# Patient Record
Sex: FEMALE | Race: BLACK OR AFRICAN AMERICAN | NOT HISPANIC OR LATINO | URBAN - METROPOLITAN AREA
[De-identification: names, ages, dates, MRNs, and addresses within clinical notes are randomized per-mention and may not be internally consistent; named-entity substitution may affect disease eponyms.]

---

## 2022-10-14 ENCOUNTER — CLAIMS CREATED FROM THE CLAIM WINDOW (OUTPATIENT)
Dept: URBAN - METROPOLITAN AREA MEDICAL CENTER 12 | Facility: MEDICAL CENTER | Age: 32
End: 2022-10-14

## 2022-10-14 ENCOUNTER — CLAIMS CREATED FROM THE CLAIM WINDOW (OUTPATIENT)
Dept: URBAN - METROPOLITAN AREA MEDICAL CENTER 12 | Facility: MEDICAL CENTER | Age: 32
End: 2022-10-14
Payer: MEDICARE

## 2022-10-14 DIAGNOSIS — K85.80 ACUTE VIRAL PANCREATITIS: ICD-10-CM

## 2022-10-14 DIAGNOSIS — E10.10 DKA, TYPE 1: ICD-10-CM

## 2022-10-14 DIAGNOSIS — R74.8 ABNORMAL ALKALINE PHOSPHATASE TEST: ICD-10-CM

## 2022-10-14 DIAGNOSIS — R10.84 ABDOMINAL CRAMPING, GENERALIZED: ICD-10-CM

## 2022-10-14 PROCEDURE — 99222 1ST HOSP IP/OBS MODERATE 55: CPT | Performed by: PHYSICIAN ASSISTANT

## 2022-10-14 PROCEDURE — G8427 DOCREV CUR MEDS BY ELIG CLIN: HCPCS | Performed by: PHYSICIAN ASSISTANT

## 2022-10-15 ENCOUNTER — OUT OF OFFICE VISIT (OUTPATIENT)
Dept: URBAN - METROPOLITAN AREA MEDICAL CENTER 12 | Facility: MEDICAL CENTER | Age: 32
End: 2022-10-15
Payer: MEDICARE

## 2022-10-15 DIAGNOSIS — R11.2 ACUTE NAUSEA WITH NONBILIOUS VOMITING: ICD-10-CM

## 2022-10-15 DIAGNOSIS — K85.80 ACUTE VIRAL PANCREATITIS: ICD-10-CM

## 2022-10-15 DIAGNOSIS — R74.8 ABNORMAL ALKALINE PHOSPHATASE TEST: ICD-10-CM

## 2022-10-15 DIAGNOSIS — R10.84 ABDOMINAL CRAMPING, GENERALIZED: ICD-10-CM

## 2022-10-15 PROCEDURE — 99232 SBSQ HOSP IP/OBS MODERATE 35: CPT | Performed by: STUDENT IN AN ORGANIZED HEALTH CARE EDUCATION/TRAINING PROGRAM

## 2022-10-24 ENCOUNTER — OUT OF OFFICE VISIT (OUTPATIENT)
Dept: URBAN - METROPOLITAN AREA MEDICAL CENTER 28 | Facility: MEDICAL CENTER | Age: 32
End: 2022-10-24
Payer: MEDICARE

## 2022-10-24 DIAGNOSIS — E10.10 DIABETIC KETOACIDOSIS WITHOUT COMA ASSOCIATED WITH TYPE 1 DIABETES MELLITUS: ICD-10-CM

## 2022-10-24 DIAGNOSIS — R10.84 ABDOMINAL CRAMPING, GENERALIZED: ICD-10-CM

## 2022-10-24 DIAGNOSIS — E10.65 DIABETES MELLITUS TYPE 1, UNCONTROLLED, INSULIN DEPENDENT: ICD-10-CM

## 2022-10-24 DIAGNOSIS — D64.89 ANEMIA DUE TO OTHER CAUSE: ICD-10-CM

## 2022-10-24 DIAGNOSIS — R11.2 ACUTE NAUSEA WITH NONBILIOUS VOMITING: ICD-10-CM

## 2022-10-24 PROCEDURE — 99232 SBSQ HOSP IP/OBS MODERATE 35: CPT

## 2022-10-24 PROCEDURE — 99222 1ST HOSP IP/OBS MODERATE 55: CPT

## 2022-10-24 PROCEDURE — G8427 DOCREV CUR MEDS BY ELIG CLIN: HCPCS

## 2022-10-28 ENCOUNTER — OUT OF OFFICE VISIT (OUTPATIENT)
Dept: URBAN - METROPOLITAN AREA MEDICAL CENTER 28 | Facility: MEDICAL CENTER | Age: 32
End: 2022-10-28
Payer: MEDICARE

## 2022-10-28 DIAGNOSIS — K29.60 ADENOPAPILLOMATOSIS GASTRICA: ICD-10-CM

## 2022-10-28 DIAGNOSIS — K22.10 BARRETT'S ESOPHAGEAL ULCERATION: ICD-10-CM

## 2022-10-28 DIAGNOSIS — D50.9 ANEMIA: ICD-10-CM

## 2022-10-28 PROCEDURE — 45378 DIAGNOSTIC COLONOSCOPY: CPT | Performed by: INTERNAL MEDICINE

## 2022-10-28 PROCEDURE — 43239 EGD BIOPSY SINGLE/MULTIPLE: CPT | Performed by: INTERNAL MEDICINE

## 2022-11-23 ENCOUNTER — OUT OF OFFICE VISIT (OUTPATIENT)
Dept: URBAN - METROPOLITAN AREA MEDICAL CENTER 28 | Facility: MEDICAL CENTER | Age: 32
End: 2022-11-23
Payer: MEDICARE

## 2022-11-23 DIAGNOSIS — R11.2 ACUTE NAUSEA WITH NONBILIOUS VOMITING: ICD-10-CM

## 2022-11-23 DIAGNOSIS — E10.43 GASTROPARESIS DUE TO TYPE 1 DIABETES MELLITUS: ICD-10-CM

## 2022-11-23 PROCEDURE — 99223 1ST HOSP IP/OBS HIGH 75: CPT | Performed by: PHYSICIAN ASSISTANT

## 2022-11-23 PROCEDURE — G8427 DOCREV CUR MEDS BY ELIG CLIN: HCPCS | Performed by: PHYSICIAN ASSISTANT

## 2022-12-07 PROBLEM — 420270002: Status: ACTIVE | Noted: 2022-12-07

## 2023-02-13 ENCOUNTER — CLAIMS CREATED FROM THE CLAIM WINDOW (OUTPATIENT)
Dept: URBAN - METROPOLITAN AREA MEDICAL CENTER 28 | Facility: MEDICAL CENTER | Age: 33
End: 2023-02-13

## 2023-02-13 ENCOUNTER — CLAIMS CREATED FROM THE CLAIM WINDOW (OUTPATIENT)
Dept: URBAN - METROPOLITAN AREA MEDICAL CENTER 28 | Facility: MEDICAL CENTER | Age: 33
End: 2023-02-13
Payer: MEDICARE

## 2023-02-13 DIAGNOSIS — R93.3 ABN FINDINGS-GI TRACT: ICD-10-CM

## 2023-02-13 DIAGNOSIS — K92.0 BLOODY EMESIS: ICD-10-CM

## 2023-02-13 DIAGNOSIS — E10.65 DIABETES MELLITUS TYPE 1, UNCONTROLLED, INSULIN DEPENDENT: ICD-10-CM

## 2023-02-13 PROCEDURE — 99222 1ST HOSP IP/OBS MODERATE 55: CPT | Performed by: PHYSICIAN ASSISTANT

## 2023-02-13 PROCEDURE — G8427 DOCREV CUR MEDS BY ELIG CLIN: HCPCS | Performed by: PHYSICIAN ASSISTANT

## 2023-02-13 PROCEDURE — 99232 SBSQ HOSP IP/OBS MODERATE 35: CPT | Performed by: PHYSICIAN ASSISTANT

## 2023-02-15 ENCOUNTER — CLAIMS CREATED FROM THE CLAIM WINDOW (OUTPATIENT)
Dept: URBAN - METROPOLITAN AREA MEDICAL CENTER 28 | Facility: MEDICAL CENTER | Age: 33
End: 2023-02-15
Payer: MEDICARE

## 2023-02-15 ENCOUNTER — OUT OF OFFICE VISIT (OUTPATIENT)
Dept: URBAN - METROPOLITAN AREA MEDICAL CENTER 28 | Facility: MEDICAL CENTER | Age: 33
End: 2023-02-15

## 2023-02-15 DIAGNOSIS — R11.2 ACUTE NAUSEA WITH NONBILIOUS VOMITING: ICD-10-CM

## 2023-02-15 DIAGNOSIS — K20.80 ESOPHAGITIS DISSECANS SUPERFICIALIS: ICD-10-CM

## 2023-02-15 PROCEDURE — 43239 EGD BIOPSY SINGLE/MULTIPLE: CPT | Performed by: INTERNAL MEDICINE

## 2023-05-28 ENCOUNTER — OUT OF OFFICE VISIT (OUTPATIENT)
Dept: URBAN - METROPOLITAN AREA MEDICAL CENTER 8 | Facility: MEDICAL CENTER | Age: 33
End: 2023-05-28
Payer: MEDICARE

## 2023-05-28 DIAGNOSIS — R11.2 ACUTE NAUSEA WITH NONBILIOUS VOMITING: ICD-10-CM

## 2023-05-28 DIAGNOSIS — R10.84 ABDOMINAL CRAMPING, GENERALIZED: ICD-10-CM

## 2023-05-28 PROCEDURE — G8427 DOCREV CUR MEDS BY ELIG CLIN: HCPCS | Performed by: PHYSICIAN ASSISTANT

## 2023-05-28 PROCEDURE — 99222 1ST HOSP IP/OBS MODERATE 55: CPT | Performed by: PHYSICIAN ASSISTANT

## 2023-05-29 ENCOUNTER — OUT OF OFFICE VISIT (OUTPATIENT)
Dept: URBAN - METROPOLITAN AREA MEDICAL CENTER 8 | Facility: MEDICAL CENTER | Age: 33
End: 2023-05-29
Payer: MEDICARE

## 2023-05-29 DIAGNOSIS — R10.84 ABDOMINAL CRAMPING, GENERALIZED: ICD-10-CM

## 2023-05-29 DIAGNOSIS — R11.2 ACUTE NAUSEA WITH NONBILIOUS VOMITING: ICD-10-CM

## 2023-05-29 PROCEDURE — 99233 SBSQ HOSP IP/OBS HIGH 50: CPT | Performed by: INTERNAL MEDICINE

## 2023-05-30 ENCOUNTER — OUT OF OFFICE VISIT (OUTPATIENT)
Dept: URBAN - METROPOLITAN AREA MEDICAL CENTER 8 | Facility: MEDICAL CENTER | Age: 33
End: 2023-05-30
Payer: MEDICARE

## 2023-05-30 DIAGNOSIS — R11.2 ACUTE NAUSEA WITH NONBILIOUS VOMITING: ICD-10-CM

## 2023-05-30 DIAGNOSIS — R10.84 ABDOMINAL CRAMPING, GENERALIZED: ICD-10-CM

## 2023-05-30 PROCEDURE — 99232 SBSQ HOSP IP/OBS MODERATE 35: CPT | Performed by: INTERNAL MEDICINE

## 2023-05-31 ENCOUNTER — OUT OF OFFICE VISIT (OUTPATIENT)
Dept: URBAN - METROPOLITAN AREA MEDICAL CENTER 8 | Facility: MEDICAL CENTER | Age: 33
End: 2023-05-31
Payer: MEDICARE

## 2023-05-31 DIAGNOSIS — R11.2 ACUTE NAUSEA WITH NONBILIOUS VOMITING: ICD-10-CM

## 2023-05-31 DIAGNOSIS — K29.60 ADENOPAPILLOMATOSIS GASTRICA: ICD-10-CM

## 2023-05-31 DIAGNOSIS — K22.89 DILATATION OF ESOPHAGUS: ICD-10-CM

## 2023-05-31 DIAGNOSIS — K31.89 ACQUIRED DEFORMITY OF DUODENUM: ICD-10-CM

## 2023-05-31 PROCEDURE — 43239 EGD BIOPSY SINGLE/MULTIPLE: CPT | Performed by: INTERNAL MEDICINE

## 2023-06-01 ENCOUNTER — OUT OF OFFICE VISIT (OUTPATIENT)
Dept: URBAN - METROPOLITAN AREA MEDICAL CENTER 8 | Facility: MEDICAL CENTER | Age: 33
End: 2023-06-01
Payer: MEDICARE

## 2023-06-01 DIAGNOSIS — R11.0 AM NAUSEA: ICD-10-CM

## 2023-06-01 DIAGNOSIS — R10.84 ABDOMINAL CRAMPING, GENERALIZED: ICD-10-CM

## 2023-06-01 PROCEDURE — 99232 SBSQ HOSP IP/OBS MODERATE 35: CPT | Performed by: INTERNAL MEDICINE

## 2023-06-15 ENCOUNTER — TELEPHONE ENCOUNTER (OUTPATIENT)
Dept: URBAN - METROPOLITAN AREA CLINIC 29 | Facility: CLINIC | Age: 33
End: 2023-06-15

## 2023-06-22 ENCOUNTER — OFFICE VISIT (OUTPATIENT)
Dept: URBAN - METROPOLITAN AREA CLINIC 27 | Facility: CLINIC | Age: 33
End: 2023-06-22
Payer: MEDICARE

## 2023-06-22 ENCOUNTER — TELEPHONE ENCOUNTER (OUTPATIENT)
Dept: URBAN - METROPOLITAN AREA CLINIC 27 | Facility: CLINIC | Age: 33
End: 2023-06-22

## 2023-06-22 ENCOUNTER — WEB ENCOUNTER (OUTPATIENT)
Dept: URBAN - METROPOLITAN AREA CLINIC 27 | Facility: CLINIC | Age: 33
End: 2023-06-22

## 2023-06-22 VITALS
HEIGHT: 65 IN | DIASTOLIC BLOOD PRESSURE: 101 MMHG | WEIGHT: 132.8 LBS | BODY MASS INDEX: 22.13 KG/M2 | SYSTOLIC BLOOD PRESSURE: 142 MMHG | HEART RATE: 93 BPM

## 2023-06-22 DIAGNOSIS — R13.10 DYSPHAGIA: ICD-10-CM

## 2023-06-22 DIAGNOSIS — R10.84 GENERALIZED ABDOMINAL PAIN: ICD-10-CM

## 2023-06-22 DIAGNOSIS — K21.9 GERD: ICD-10-CM

## 2023-06-22 DIAGNOSIS — R68.81 EARLY SATIETY: ICD-10-CM

## 2023-06-22 DIAGNOSIS — R19.7 DIARRHEA: ICD-10-CM

## 2023-06-22 DIAGNOSIS — E11.9: ICD-10-CM

## 2023-06-22 DIAGNOSIS — K31.84 GASTROPARESIS: ICD-10-CM

## 2023-06-22 DIAGNOSIS — F41.9 ANXIETY: ICD-10-CM

## 2023-06-22 DIAGNOSIS — R11.2 NAUSEA & VOMITING: ICD-10-CM

## 2023-06-22 PROCEDURE — 99214 OFFICE O/P EST MOD 30 MIN: CPT | Performed by: INTERNAL MEDICINE

## 2023-06-22 RX ORDER — PANTOPRAZOLE SODIUM 40 MG/1
1 PACKET MIXED WITH APPLE JUICE OR APPLESAUCE FOR SUSPENSION ORAL TWICE A DAY
Qty: 60 | Refills: 3 | OUTPATIENT
Start: 2023-06-22

## 2023-06-22 RX ORDER — INSULIN GLARGINE 100 [IU]/ML
INJECTION, SOLUTION SUBCUTANEOUS
Qty: 0 | Refills: 0 | Status: ACTIVE | COMMUNITY
Start: 1900-01-01

## 2023-06-22 RX ORDER — DICYCLOMINE HYDROCHLORIDE 10 MG/5ML
5 OR 10 ML 30 MINUTES BEFORE EATING SOLUTION ORAL THREE TIMES A DAY
Qty: 900 | Refills: 3 | OUTPATIENT
Start: 2023-06-22 | End: 2023-07-22

## 2023-06-22 RX ORDER — PROMETHAZINE HYDROCHLORIDE 25 MG/1
1 TABLET AS NEEDED TABLET ORAL
Qty: 90 | Refills: 5 | OUTPATIENT
Start: 2023-06-22 | End: 2023-07-22

## 2023-06-22 NOTE — HPI-TODAY'S VISIT:
Patient here to follow up from recent hospitalization at [facility] for intractable nausea/vomiting and uncontrolled diabetes with hyperglycemia.  She was admitted on May 26 with nausea/vomiting, abdominal pain, and hyperglycemia with a blood glucose of 772.  She underwent upper endoscopy on May 31 which revealed severe reflux esophagitis with linear erosions and extensive ulceration in the mid and distal esophagus.  A small hiatal hernia was seen, as well as mild nonerosive gastritis and mild erosive bulbar duodenitis.  Gastric biopsies revealed moderate to marked chronic focally active gastritis without H. pylori.  She was aggressively treated with pantoprazole, Carafate slurry, erythromycin, Reglan, levsin and antiemetics.  Her symptoms slowly improved and she was discharged home 2 weeks ago.  She is here in follow-up.  She continues to have intermittent nausea and vomiting, typically 15 minutes after eating nearly anything.  She does have a early satiety as well.  She is usually careful to eat frequent small meals.  She is currently using promethazine 12.5mg every 6 hours, Reglan 10 mg 3 times a day, erythromycin 500 mg twice daily and Carafate slurry twice daily.  She is not taking pantoprazole.  She does have intermittent reflux symptoms, as well as dysphagia to solids stating, "it is hard to swallow".  She also has crampy generalized abdominal pain which is often worse during vomiting.  She has also had persistent diarrhea which began approximately 1 week after the recent hospitalization.  The diarrhea is frequently nocturnal.  She typically has 5 stools per night.  She does use Imodium which is somewhat helpful.  She sometimes has fecal incontinence.  She states that her glycemic control has been "better" though her fsbs is sometimes around 250.  She does have moderate stress and anxiety.  She was diagnosed with gastroparesis in 2015.  She had a gastric emptying study in 2017 which showed less than 5% emptying at 85 minutes.  She has been a type 1 insulin-dependent diabetic since the age of 8.  She has had multiple admissions for gastroparesis over the past 5 years.  There is no family history of colon cancer or polyps.  She apparently had a colonoscopy last year which was unremarkable although the prep was poor.  CT abdomen pelvis during the recent hospitalization showed nonspecific distal esophageal and gastric mural thickening.  She also had mild diffuse bladder wall thickening.  She does not see a dietician.

## 2023-06-22 NOTE — PHYSICAL EXAM GASTROINTESTINAL
Abdomen is soft, mild diffuse TTP, nondistended, no guarding or rigidity, no masses palpable, normal bowel sounds

## 2023-07-20 ENCOUNTER — TELEPHONE ENCOUNTER (OUTPATIENT)
Dept: URBAN - METROPOLITAN AREA CLINIC 27 | Facility: CLINIC | Age: 33
End: 2023-07-20

## 2023-07-20 ENCOUNTER — DASHBOARD ENCOUNTERS (OUTPATIENT)
Age: 33
End: 2023-07-20

## 2023-07-20 ENCOUNTER — OFFICE VISIT (OUTPATIENT)
Dept: URBAN - METROPOLITAN AREA CLINIC 27 | Facility: CLINIC | Age: 33
End: 2023-07-20
Payer: MEDICARE

## 2023-07-20 ENCOUNTER — OFFICE VISIT (OUTPATIENT)
Dept: URBAN - METROPOLITAN AREA CLINIC 96 | Facility: CLINIC | Age: 33
End: 2023-07-20

## 2023-07-20 VITALS
WEIGHT: 133 LBS | HEIGHT: 65 IN | SYSTOLIC BLOOD PRESSURE: 142 MMHG | BODY MASS INDEX: 22.16 KG/M2 | DIASTOLIC BLOOD PRESSURE: 101 MMHG | HEART RATE: 93 BPM

## 2023-07-20 DIAGNOSIS — K31.84 GASTROPARESIS: ICD-10-CM

## 2023-07-20 DIAGNOSIS — K21.9 GERD: ICD-10-CM

## 2023-07-20 DIAGNOSIS — R19.7 DIARRHEA: ICD-10-CM

## 2023-07-20 DIAGNOSIS — R11.2 NAUSEA & VOMITING: ICD-10-CM

## 2023-07-20 PROCEDURE — 99215 OFFICE O/P EST HI 40 MIN: CPT | Performed by: INTERNAL MEDICINE

## 2023-07-20 RX ORDER — LANSOPRAZOLE 30 MG/1
1 CAPSULE BEFORE A MEAL CAPSULE, DELAYED RELEASE ORAL TWICE DAILY
Qty: 60 | Refills: 3 | OUTPATIENT
Start: 2023-07-20

## 2023-07-20 RX ORDER — PROMETHAZINE HYDROCHLORIDE 25 MG/1
1 TABLET AS NEEDED TABLET ORAL
Qty: 90 | Refills: 5 | Status: ACTIVE | COMMUNITY
Start: 2023-06-22 | End: 2023-07-22

## 2023-07-20 RX ORDER — DICYCLOMINE HYDROCHLORIDE 10 MG/5ML
5 OR 10 ML 30 MINUTES BEFORE EATING SOLUTION ORAL THREE TIMES A DAY
Qty: 900 | Refills: 3 | Status: ACTIVE | COMMUNITY
Start: 2023-06-22 | End: 2023-07-22

## 2023-07-20 RX ORDER — PANTOPRAZOLE SODIUM 40 MG/1
1 PACKET MIXED WITH APPLE JUICE OR APPLESAUCE FOR SUSPENSION ORAL TWICE A DAY
Qty: 60 | Refills: 3 | Status: ACTIVE | COMMUNITY
Start: 2023-06-22

## 2023-07-20 RX ORDER — INSULIN GLARGINE 100 [IU]/ML
INJECTION, SOLUTION SUBCUTANEOUS
Qty: 0 | Refills: 0 | Status: ACTIVE | COMMUNITY
Start: 1900-01-01

## 2023-07-20 RX ORDER — MIRTAZAPINE 15 MG/1
1 TABLET AT BEDTIME TABLET, FILM COATED ORAL ONCE A DAY
Qty: 30 | Refills: 3 | OUTPATIENT
Start: 2023-07-20

## 2023-07-20 NOTE — HPI-TODAY'S VISIT:
Patient here for GI followup.  She was admitted to [facility] on May 26 with nausea/vomiting, abdominal pain, and hyperglycemia with a blood glucose of 772.  She underwent upper endoscopy on May 31 which revealed severe reflux esophagitis with linear erosions and extensive ulceration in the mid and distal esophagus.  A small hiatal hernia was seen, as well as mild nonerosive gastritis and mild erosive bulbar duodenitis.  Gastric biopsies revealed moderate to marked chronic focally active gastritis without H. pylori.  She was aggressively treated with pantoprazole, Carafate slurry, erythromycin, Reglan, levsin and antiemetics.  Her symptoms slowly improved and she was discharged home in early June.  She remains symptomatic.  Her reflux symptoms are suboptimally controlled with use of pantoprazole 40 mg twice daily and Carafate slurry 4 times daily.  She seems to be adherent to an antireflux regimen.  She has occasional dysphagia to solids and regurgitation of the food bolus.  She continues to have intermittent nausea and vomiting despite using staggered Reglan with Phenergan, as well as erythromycin 3 times daily.  Her glycemic control has been much better recently.  She has not had any recent diarrhea.  She has had intermittent bloating.  She does take a probiotic but does not use any anti-gas medication.  She continues to have generalized abdominal pain for which dicyclomine has not been helpful.  She is taking 1 tablet 3 times daily.  She continues to have suboptimally controlled anxiety.  She does have early satiety, and does eat frequent small meals.    She was diagnosed with gastroparesis in 2015.  She had a gastric emptying study in 2017 which showed less than 5% emptying at 85 minutes.  She has been a type 1 insulin-dependent diabetic since the age of 8.  She has had multiple admissions for gastroparesis over the past 5 years.  There is no family history of colon cancer or polyps.  She apparently had a colonoscopy last year which was unremarkable although the prep was poor.  CT abdomen pelvis during the recent hospitalization showed nonspecific distal esophageal and gastric mural thickening.  She also had mild diffuse bladder wall thickening.  She has not seen a dietician.

## 2023-07-20 NOTE — PHYSICAL EXAM GASTROINTESTINAL
Abdomen is soft, mild diffuse TTP, nondistended, no guarding or rigidity, no masses palpable, decreased bowel sounds

## 2023-07-21 ENCOUNTER — OFFICE VISIT (OUTPATIENT)
Dept: URBAN - METROPOLITAN AREA TELEHEALTH 2 | Facility: TELEHEALTH | Age: 33
End: 2023-07-21
Payer: MEDICARE

## 2023-07-21 DIAGNOSIS — K31.84 GASTROPARESIS: ICD-10-CM

## 2023-07-21 DIAGNOSIS — E11.9: ICD-10-CM

## 2023-07-21 PROCEDURE — 97802 MEDICAL NUTRITION INDIV IN: CPT | Performed by: DIETITIAN, REGISTERED

## 2023-07-21 RX ORDER — PANTOPRAZOLE SODIUM 40 MG/1
1 PACKET MIXED WITH APPLE JUICE OR APPLESAUCE FOR SUSPENSION ORAL TWICE A DAY
Qty: 60 | Refills: 3 | Status: ACTIVE | COMMUNITY
Start: 2023-06-22

## 2023-07-21 RX ORDER — LANSOPRAZOLE 30 MG/1
1 CAPSULE BEFORE A MEAL CAPSULE, DELAYED RELEASE ORAL TWICE DAILY
Qty: 60 | Refills: 3 | Status: ACTIVE | COMMUNITY
Start: 2023-07-20

## 2023-07-21 RX ORDER — PROMETHAZINE HYDROCHLORIDE 25 MG/1
1 TABLET AS NEEDED TABLET ORAL
Qty: 90 | Refills: 5 | Status: ACTIVE | COMMUNITY
Start: 2023-06-22 | End: 2023-07-22

## 2023-07-21 RX ORDER — INSULIN GLARGINE 100 [IU]/ML
INJECTION, SOLUTION SUBCUTANEOUS
Qty: 0 | Refills: 0 | Status: ACTIVE | COMMUNITY
Start: 1900-01-01

## 2023-07-21 RX ORDER — DICYCLOMINE HYDROCHLORIDE 10 MG/5ML
5 OR 10 ML 30 MINUTES BEFORE EATING SOLUTION ORAL THREE TIMES A DAY
Qty: 900 | Refills: 3 | Status: ACTIVE | COMMUNITY
Start: 2023-06-22 | End: 2023-07-22

## 2023-07-21 RX ORDER — MIRTAZAPINE 15 MG/1
1 TABLET AT BEDTIME TABLET, FILM COATED ORAL ONCE A DAY
Qty: 30 | Refills: 3 | Status: ACTIVE | COMMUNITY
Start: 2023-07-20

## 2023-09-11 ENCOUNTER — ERX REFILL RESPONSE (OUTPATIENT)
Dept: URBAN - METROPOLITAN AREA CLINIC 27 | Facility: CLINIC | Age: 33
End: 2023-09-11

## 2023-09-11 RX ORDER — LANSOPRAZOLE 30 MG/1
1 CAPSULE BEFORE A MEAL CAPSULE, DELAYED RELEASE ORAL TWICE DAILY
Qty: 60 | Refills: 3 | OUTPATIENT

## 2023-09-11 RX ORDER — LANSOPRAZOLE 30 MG/1
TAKE 1 CAPSULE BEFORE A MEAL ORALLY TWICE DAILY 30 DAYS CAPSULE, DELAYED RELEASE ORAL
Qty: 60 CAPSULE | Refills: 3 | OUTPATIENT

## 2024-03-25 ENCOUNTER — LAB (OUTPATIENT)
Dept: URBAN - METROPOLITAN AREA MEDICAL CENTER 12 | Facility: MEDICAL CENTER | Age: 34
End: 2024-03-25

## 2024-03-25 PROCEDURE — 99254 IP/OBS CNSLTJ NEW/EST MOD 60: CPT | Performed by: INTERNAL MEDICINE

## 2024-03-26 ENCOUNTER — LAB (OUTPATIENT)
Dept: URBAN - METROPOLITAN AREA MEDICAL CENTER 12 | Facility: MEDICAL CENTER | Age: 34
End: 2024-03-26

## 2024-03-26 PROCEDURE — 43235 EGD DIAGNOSTIC BRUSH WASH: CPT | Performed by: INTERNAL MEDICINE

## 2024-10-12 ENCOUNTER — CLAIMS CREATED FROM THE CLAIM WINDOW (OUTPATIENT)
Dept: URBAN - METROPOLITAN AREA MEDICAL CENTER 12 | Facility: MEDICAL CENTER | Age: 34
End: 2024-10-12
Payer: MEDICARE

## 2024-10-12 DIAGNOSIS — K31.84 GASTROPARESIS: ICD-10-CM

## 2024-10-12 DIAGNOSIS — R11.2 ACUTE NAUSEA WITH NONBILIOUS VOMITING: ICD-10-CM

## 2024-10-12 DIAGNOSIS — E10.43 TYPE 1 DIABETES MELLITUS WITH DIABETIC AUTONOMIC (POLY)NEUROPATHY: ICD-10-CM

## 2024-10-12 DIAGNOSIS — D64.89 ANEMIA DUE TO OTHER CAUSE: ICD-10-CM

## 2024-10-12 PROCEDURE — G0316 ESRD RELATED SVS 1VIS/12-19Y: HCPCS

## 2024-10-12 PROCEDURE — 99233 SBSQ HOSP IP/OBS HIGH 50: CPT

## 2024-10-13 ENCOUNTER — CLAIMS CREATED FROM THE CLAIM WINDOW (OUTPATIENT)
Dept: URBAN - METROPOLITAN AREA MEDICAL CENTER 12 | Facility: MEDICAL CENTER | Age: 34
End: 2024-10-13
Payer: MEDICARE

## 2024-10-13 DIAGNOSIS — D64.89 ANEMIA DUE TO OTHER CAUSE: ICD-10-CM

## 2024-10-13 DIAGNOSIS — E10.43 TYPE 1 DIABETES MELLITUS WITH DIABETIC AUTONOMIC (POLY)NEUROPATHY: ICD-10-CM

## 2024-10-13 DIAGNOSIS — R11.2 ACUTE NAUSEA WITH NONBILIOUS VOMITING: ICD-10-CM

## 2024-10-13 DIAGNOSIS — K31.84 GASTROPARESIS: ICD-10-CM

## 2024-10-13 PROCEDURE — 99233 SBSQ HOSP IP/OBS HIGH 50: CPT

## 2024-10-13 PROCEDURE — G0316 ESRD RELATED SVS 1VIS/12-19Y: HCPCS

## 2024-10-14 ENCOUNTER — CLAIMS CREATED FROM THE CLAIM WINDOW (OUTPATIENT)
Dept: URBAN - METROPOLITAN AREA MEDICAL CENTER 12 | Facility: MEDICAL CENTER | Age: 34
End: 2024-10-14
Payer: MEDICARE

## 2024-10-14 DIAGNOSIS — R10.84 ABDOMINAL CRAMPING, GENERALIZED: ICD-10-CM

## 2024-10-14 DIAGNOSIS — E10.43 TYPE 1 DIABETES MELLITUS WITH DIABETIC AUTONOMIC (POLY)NEUROPATHY: ICD-10-CM

## 2024-10-14 DIAGNOSIS — Z87.19 PERSONAL HISTORY OF OTHER DISEASES OF THE DIGESTIVE SYSTEM: ICD-10-CM

## 2024-10-14 DIAGNOSIS — K31.84 GASTROPARESIS: ICD-10-CM

## 2024-10-14 PROCEDURE — 99231 SBSQ HOSP IP/OBS SF/LOW 25: CPT | Performed by: PHYSICIAN ASSISTANT

## 2024-10-15 ENCOUNTER — CLAIMS CREATED FROM THE CLAIM WINDOW (OUTPATIENT)
Dept: URBAN - METROPOLITAN AREA MEDICAL CENTER 12 | Facility: MEDICAL CENTER | Age: 34
End: 2024-10-15
Payer: MEDICARE

## 2024-10-15 DIAGNOSIS — R10.13 ABDOMINAL DISCOMFORT, EPIGASTRIC: ICD-10-CM

## 2024-10-15 DIAGNOSIS — R11.2 ACUTE NAUSEA WITH NONBILIOUS VOMITING: ICD-10-CM

## 2024-10-15 DIAGNOSIS — K21.00 ALKALINE REFLUX ESOPHAGITIS: ICD-10-CM

## 2024-10-15 PROCEDURE — 43239 EGD BIOPSY SINGLE/MULTIPLE: CPT | Performed by: INTERNAL MEDICINE

## 2024-10-15 PROCEDURE — 43235 EGD DIAGNOSTIC BRUSH WASH: CPT | Performed by: INTERNAL MEDICINE

## 2024-11-18 ENCOUNTER — OFFICE VISIT (OUTPATIENT)
Dept: URBAN - METROPOLITAN AREA CLINIC 92 | Facility: CLINIC | Age: 34
End: 2024-11-18

## 2024-11-18 RX ORDER — INSULIN GLARGINE 100 [IU]/ML
INJECTION, SOLUTION SUBCUTANEOUS
Qty: 0 | Refills: 0 | Status: ACTIVE | COMMUNITY
Start: 1900-01-01

## 2024-11-18 RX ORDER — MIRTAZAPINE 15 MG/1
1 TABLET AT BEDTIME TABLET, FILM COATED ORAL ONCE A DAY
Qty: 30 | Refills: 3 | Status: ACTIVE | COMMUNITY
Start: 2023-07-20

## 2024-11-18 RX ORDER — LANSOPRAZOLE 30 MG/1
TAKE 1 CAPSULE BEFORE A MEAL ORALLY TWICE DAILY 30 DAYS CAPSULE, DELAYED RELEASE ORAL
Qty: 60 CAPSULE | Refills: 3 | Status: ACTIVE | COMMUNITY

## 2024-11-18 RX ORDER — PANTOPRAZOLE SODIUM 40 MG/1
1 PACKET MIXED WITH APPLE JUICE OR APPLESAUCE FOR SUSPENSION ORAL TWICE A DAY
Qty: 60 | Refills: 3 | Status: ACTIVE | COMMUNITY
Start: 2023-06-22

## 2024-11-18 NOTE — HPI-TODAY'S VISIT:
33-year-old female patient presents today for a hospital visit follow-up.  She is a past medical history of type 1 diabetes, gastroparesis, CKD stage III, pancreatitis, esophagitis and iron deficiency anemia.  Patient was admitted on 10-6 at [facility] [place] for nausea and vomiting.  As noted that her last hospitalization was in March 2024 where she presented with abdominal pain, nausea and vomiting.  Endoscopy in March demonstrated LA grade D esophagitis without bleeding, clear gastric fluid, normal esophagus and duodenum.  During her admission patient reported frequent nausea and vomiting beginning a month ago along with upper abdominal pain.  CT A/P demonstrated no abnormalities.  She had a endoscopy on 10- that demonstrated LA grade D reflux esophagitis without bleeding, small hiatal hernia she was told to start PPI twice daily biopsy was negative for H. pylori .  Patient had decreased hemoglobin throughout hospital visit however declined any blood products.  Labs 10- demonstrated hemoglobin 6.3, hct 20.9 Due to leg swelling patient had Doppler of her lower extremities and this was negative for blood clot She was told to take metoclopramide 10 mg before meals and at bedtime and adjusted Oxy 5 to every 4 hours as needed.  She had a EKG that demonstrated improved QTc.  Scopolamine and Carafate was added on.  Amitriptyline 25 mg was added

## 2024-12-10 ENCOUNTER — OFFICE VISIT (OUTPATIENT)
Dept: URBAN - METROPOLITAN AREA CLINIC 92 | Facility: CLINIC | Age: 34
End: 2024-12-10

## 2024-12-10 RX ORDER — INSULIN GLARGINE 100 [IU]/ML
INJECTION, SOLUTION SUBCUTANEOUS
Qty: 0 | Refills: 0 | Status: ACTIVE | COMMUNITY
Start: 1900-01-01

## 2024-12-10 RX ORDER — MIRTAZAPINE 15 MG/1
1 TABLET AT BEDTIME TABLET, FILM COATED ORAL ONCE A DAY
Qty: 30 | Refills: 3 | Status: ACTIVE | COMMUNITY
Start: 2023-07-20

## 2024-12-10 RX ORDER — LANSOPRAZOLE 30 MG/1
TAKE 1 CAPSULE BEFORE A MEAL ORALLY TWICE DAILY 30 DAYS CAPSULE, DELAYED RELEASE ORAL
Qty: 60 CAPSULE | Refills: 3 | Status: ACTIVE | COMMUNITY

## 2024-12-10 RX ORDER — PANTOPRAZOLE SODIUM 40 MG/1
1 PACKET MIXED WITH APPLE JUICE OR APPLESAUCE FOR SUSPENSION ORAL TWICE A DAY
Qty: 60 | Refills: 3 | Status: ACTIVE | COMMUNITY
Start: 2023-06-22

## 2024-12-10 NOTE — HPI-TODAY'S VISIT:
33-year-old female patient presents today for a hospital visit follow-up.  She is a past medical history of type 1 diabetes, gastroparesis, CKD stage III, pancreatitis, esophagitis and iron deficiency anemia.  Patient was admitted on 10-6 at [facility] for nausea and vomiting.  As noted that her last hospitalization was in March 2024 where she presented with abdominal pain, nausea and vomiting.  Endoscopy in March demonstrated LA grade D esophagitis without bleeding, clear gastric fluid, normal esophagus and duodenum.  During her admission patient reported frequent nausea and vomiting beginning a month ago along with upper abdominal pain.  CT A/P demonstrated no abnormalities.  She had a endoscopy on 10- that demonstrated LA grade D reflux esophagitis without bleeding, small hiatal hernia she was told to start PPI twice daily biopsy was negative for H. pylori .  Patient had decreased hemoglobin throughout hospital visit however declined any blood products.  Labs 10- demonstrated hemoglobin 6.3, hct 20.9 Due to leg swelling patient had Doppler of her lower extremities and this was negative for blood clot She was told to take metoclopramide 10 mg before meals and at bedtime and adjusted Oxy 5 to every 4 hours as needed.  She had a EKG that demonstrated improved QTc.  Scopolamine and Carafate was added on.  Amitriptyline 25 mg was added

## 2025-01-14 ENCOUNTER — CLAIMS CREATED FROM THE CLAIM WINDOW (OUTPATIENT)
Dept: URBAN - METROPOLITAN AREA MEDICAL CENTER 12 | Facility: MEDICAL CENTER | Age: 35
End: 2025-01-14
Payer: MEDICARE

## 2025-01-14 DIAGNOSIS — K92.1 ACUTE MELENA: ICD-10-CM

## 2025-01-14 DIAGNOSIS — R11.2 ACUTE NAUSEA WITH NONBILIOUS VOMITING: ICD-10-CM

## 2025-01-14 DIAGNOSIS — D64.89 ANEMIA DUE TO OTHER CAUSE: ICD-10-CM

## 2025-01-14 DIAGNOSIS — R10.84 ABDOMINAL CRAMPING, GENERALIZED: ICD-10-CM

## 2025-01-14 PROCEDURE — G8427 DOCREV CUR MEDS BY ELIG CLIN: HCPCS | Performed by: INTERNAL MEDICINE

## 2025-01-14 PROCEDURE — 99222 1ST HOSP IP/OBS MODERATE 55: CPT | Performed by: INTERNAL MEDICINE

## 2025-01-14 PROCEDURE — 99254 IP/OBS CNSLTJ NEW/EST MOD 60: CPT | Performed by: INTERNAL MEDICINE

## 2025-01-15 ENCOUNTER — CLAIMS CREATED FROM THE CLAIM WINDOW (OUTPATIENT)
Dept: URBAN - METROPOLITAN AREA MEDICAL CENTER 12 | Facility: MEDICAL CENTER | Age: 35
End: 2025-01-15
Payer: MEDICARE

## 2025-01-15 DIAGNOSIS — R10.84 ABDOMINAL CRAMPING, GENERALIZED: ICD-10-CM

## 2025-01-15 DIAGNOSIS — D64.89 ANEMIA DUE TO OTHER CAUSE: ICD-10-CM

## 2025-01-15 DIAGNOSIS — K92.1 ACUTE MELENA: ICD-10-CM

## 2025-01-15 DIAGNOSIS — R11.2 ACUTE NAUSEA WITH NONBILIOUS VOMITING: ICD-10-CM

## 2025-01-15 PROCEDURE — 99232 SBSQ HOSP IP/OBS MODERATE 35: CPT | Performed by: PHYSICIAN ASSISTANT

## 2025-01-16 ENCOUNTER — CLAIMS CREATED FROM THE CLAIM WINDOW (OUTPATIENT)
Dept: URBAN - METROPOLITAN AREA MEDICAL CENTER 12 | Facility: MEDICAL CENTER | Age: 35
End: 2025-01-16
Payer: MEDICARE

## 2025-01-16 DIAGNOSIS — K29.60 ADENOPAPILLOMATOSIS GASTRICA: ICD-10-CM

## 2025-01-16 DIAGNOSIS — K20.80 OTHER ESOPHAGITIS WITHOUT BLEEDING: ICD-10-CM

## 2025-01-16 PROCEDURE — 43239 EGD BIOPSY SINGLE/MULTIPLE: CPT | Performed by: INTERNAL MEDICINE

## 2025-01-16 PROCEDURE — 43235 EGD DIAGNOSTIC BRUSH WASH: CPT | Performed by: INTERNAL MEDICINE

## 2025-01-17 ENCOUNTER — CLAIMS CREATED FROM THE CLAIM WINDOW (OUTPATIENT)
Dept: URBAN - METROPOLITAN AREA MEDICAL CENTER 12 | Facility: MEDICAL CENTER | Age: 35
End: 2025-01-17
Payer: MEDICARE

## 2025-01-17 DIAGNOSIS — K20.80 OTHER ESOPHAGITIS WITHOUT BLEEDING: ICD-10-CM

## 2025-01-17 DIAGNOSIS — D64.89 ANEMIA DUE TO OTHER CAUSE: ICD-10-CM

## 2025-01-17 DIAGNOSIS — K92.1 ACUTE MELENA: ICD-10-CM

## 2025-01-17 PROCEDURE — 99232 SBSQ HOSP IP/OBS MODERATE 35: CPT | Performed by: PHYSICIAN ASSISTANT

## 2025-02-27 ENCOUNTER — OFFICE VISIT (OUTPATIENT)
Dept: URBAN - METROPOLITAN AREA CLINIC 92 | Facility: CLINIC | Age: 35
End: 2025-02-27

## 2025-02-27 RX ORDER — PANTOPRAZOLE SODIUM 40 MG/1
1 PACKET MIXED WITH APPLE JUICE OR APPLESAUCE FOR SUSPENSION ORAL TWICE A DAY
Qty: 60 | Refills: 3 | Status: ACTIVE | COMMUNITY
Start: 2023-06-22

## 2025-02-27 RX ORDER — MIRTAZAPINE 15 MG/1
1 TABLET AT BEDTIME TABLET, FILM COATED ORAL ONCE A DAY
Qty: 30 | Refills: 3 | Status: ACTIVE | COMMUNITY
Start: 2023-07-20

## 2025-02-27 RX ORDER — LANSOPRAZOLE 30 MG/1
TAKE 1 CAPSULE BEFORE A MEAL ORALLY TWICE DAILY 30 DAYS CAPSULE, DELAYED RELEASE ORAL
Qty: 60 CAPSULE | Refills: 3 | Status: ACTIVE | COMMUNITY

## 2025-02-27 RX ORDER — INSULIN GLARGINE 100 [IU]/ML
INJECTION, SOLUTION SUBCUTANEOUS
Qty: 0 | Refills: 0 | Status: ACTIVE | COMMUNITY
Start: 1900-01-01